# Patient Record
Sex: MALE | Race: WHITE | NOT HISPANIC OR LATINO
[De-identification: names, ages, dates, MRNs, and addresses within clinical notes are randomized per-mention and may not be internally consistent; named-entity substitution may affect disease eponyms.]

---

## 2017-03-08 PROBLEM — Z00.00 ENCOUNTER FOR PREVENTIVE HEALTH EXAMINATION: Status: ACTIVE | Noted: 2017-03-08

## 2017-04-25 ENCOUNTER — APPOINTMENT (OUTPATIENT)
Dept: GASTROENTEROLOGY | Facility: CLINIC | Age: 82
End: 2017-04-25

## 2017-04-25 VITALS
OXYGEN SATURATION: 98 % | BODY MASS INDEX: 29.82 KG/M2 | WEIGHT: 190 LBS | TEMPERATURE: 98.2 F | HEART RATE: 98 BPM | SYSTOLIC BLOOD PRESSURE: 128 MMHG | DIASTOLIC BLOOD PRESSURE: 76 MMHG | HEIGHT: 67 IN

## 2017-04-25 DIAGNOSIS — Z86.79 PERSONAL HISTORY OF OTHER DISEASES OF THE CIRCULATORY SYSTEM: ICD-10-CM

## 2017-04-25 DIAGNOSIS — K64.4 RESIDUAL HEMORRHOIDAL SKIN TAGS: ICD-10-CM

## 2017-04-25 DIAGNOSIS — L30.9 DERMATITIS, UNSPECIFIED: ICD-10-CM

## 2017-04-25 DIAGNOSIS — K59.00 CONSTIPATION, UNSPECIFIED: ICD-10-CM

## 2017-04-25 DIAGNOSIS — Z82.49 FAMILY HISTORY OF ISCHEMIC HEART DISEASE AND OTHER DISEASES OF THE CIRCULATORY SYSTEM: ICD-10-CM

## 2017-04-25 DIAGNOSIS — Z82.3 FAMILY HISTORY OF STROKE: ICD-10-CM

## 2017-04-25 DIAGNOSIS — K62.89 OTHER SPECIFIED DISEASES OF ANUS AND RECTUM: ICD-10-CM

## 2017-04-25 DIAGNOSIS — Z78.9 OTHER SPECIFIED HEALTH STATUS: ICD-10-CM

## 2017-04-25 RX ORDER — ATORVASTATIN CALCIUM 80 MG/1
80 TABLET, FILM COATED ORAL
Refills: 0 | Status: ACTIVE | COMMUNITY

## 2017-04-25 RX ORDER — MUPIROCIN 20 MG/G
2 OINTMENT TOPICAL TWICE DAILY
Qty: 1 | Refills: 1 | Status: ACTIVE | COMMUNITY
Start: 2017-04-25 | End: 1900-01-01

## 2018-01-09 ENCOUNTER — APPOINTMENT (OUTPATIENT)
Dept: OPHTHALMOLOGY | Facility: CLINIC | Age: 83
End: 2018-01-09
Payer: MEDICARE

## 2018-01-09 PROCEDURE — 92060 SENSORIMOTOR EXAMINATION: CPT

## 2018-01-09 PROCEDURE — 99204 OFFICE O/P NEW MOD 45 MIN: CPT

## 2018-01-09 RX ORDER — MIRABEGRON 50 MG/1
50 TABLET, FILM COATED, EXTENDED RELEASE ORAL
Qty: 90 | Refills: 0 | Status: ACTIVE | COMMUNITY
Start: 2017-03-30

## 2018-01-09 RX ORDER — MOXIFLOXACIN HYDROCHLORIDE 5 MG/ML
0.5 SOLUTION/ DROPS OPHTHALMIC
Qty: 3 | Refills: 0 | Status: ACTIVE | COMMUNITY
Start: 2017-10-03

## 2018-01-09 RX ORDER — PREDNISOLONE ACETATE 10 MG/ML
1 SUSPENSION/ DROPS OPHTHALMIC
Qty: 5 | Refills: 0 | Status: ACTIVE | COMMUNITY
Start: 2017-08-11

## 2018-02-16 ENCOUNTER — APPOINTMENT (OUTPATIENT)
Dept: OPHTHALMOLOGY | Facility: CLINIC | Age: 83
End: 2018-02-16
Payer: MEDICARE

## 2018-02-16 PROCEDURE — 92012 INTRM OPH EXAM EST PATIENT: CPT

## 2018-04-16 ENCOUNTER — APPOINTMENT (OUTPATIENT)
Dept: OPHTHALMOLOGY | Facility: CLINIC | Age: 83
End: 2018-04-16

## 2018-07-24 PROBLEM — Z78.9 ALCOHOL USE: Status: ACTIVE | Noted: 2017-04-25

## 2019-10-14 ENCOUNTER — OUTPATIENT (OUTPATIENT)
Dept: OUTPATIENT SERVICES | Facility: HOSPITAL | Age: 84
LOS: 1 days | Discharge: HOME | End: 2019-10-14

## 2019-10-15 ENCOUNTER — APPOINTMENT (OUTPATIENT)
Dept: RADIATION ONCOLOGY | Facility: CLINIC | Age: 84
End: 2019-10-15
Payer: MEDICARE

## 2019-10-15 VITALS
HEIGHT: 67 IN | WEIGHT: 196 LBS | SYSTOLIC BLOOD PRESSURE: 134 MMHG | BODY MASS INDEX: 30.76 KG/M2 | OXYGEN SATURATION: 98 % | TEMPERATURE: 36.4 F | RESPIRATION RATE: 16 BRPM | DIASTOLIC BLOOD PRESSURE: 86 MMHG | HEART RATE: 86 BPM

## 2019-10-15 PROCEDURE — 99204 OFFICE O/P NEW MOD 45 MIN: CPT | Mod: 25

## 2019-10-15 NOTE — PHYSICAL EXAM
[Normal] : oriented to person, place and time, the affect was normal, the mood was normal and not anxious [FreeTextEntry1] : Right leg medial aspect 1.5 X 2.0 cm skin lesion nodular

## 2019-10-15 NOTE — HISTORY OF PRESENT ILLNESS
[FreeTextEntry1] : Mr. Alexander is a 89 years old male with BCC of the proximal right tibia. He stated he had this for years. He had tried cream in the past given by his dermatologist Divine Johnson which had not help. \par Patient underwent right proximal pretibial region biopsy on 9/17/19, pathology report showed Basal cell carcinoma, nodular type fragmented.\par Today, he denies any pain, redness, or tingling sensation. He is able to ambulate without any difficulty.

## 2021-02-11 ENCOUNTER — TRANSCRIPTION ENCOUNTER (OUTPATIENT)
Age: 86
End: 2021-02-11

## 2021-08-10 ENCOUNTER — APPOINTMENT (OUTPATIENT)
Dept: UROLOGY | Facility: CLINIC | Age: 86
End: 2021-08-10

## 2021-10-25 ENCOUNTER — RX RENEWAL (OUTPATIENT)
Age: 86
End: 2021-10-25

## 2021-12-30 ENCOUNTER — RX RENEWAL (OUTPATIENT)
Age: 86
End: 2021-12-30

## 2022-01-15 ENCOUNTER — RX RENEWAL (OUTPATIENT)
Age: 87
End: 2022-01-15

## 2022-03-24 ENCOUNTER — RX RENEWAL (OUTPATIENT)
Age: 87
End: 2022-03-24

## 2022-05-10 ENCOUNTER — NON-APPOINTMENT (OUTPATIENT)
Age: 87
End: 2022-05-10

## 2022-05-10 ENCOUNTER — APPOINTMENT (OUTPATIENT)
Dept: CARDIOLOGY | Facility: CLINIC | Age: 87
End: 2022-05-10
Payer: MEDICARE

## 2022-05-10 VITALS
WEIGHT: 196 LBS | DIASTOLIC BLOOD PRESSURE: 84 MMHG | HEART RATE: 97 BPM | OXYGEN SATURATION: 98 % | TEMPERATURE: 97.6 F | SYSTOLIC BLOOD PRESSURE: 140 MMHG | BODY MASS INDEX: 30.76 KG/M2 | HEIGHT: 67 IN

## 2022-05-10 DIAGNOSIS — H49.12 FOURTH [TROCHLEAR] NERVE PALSY, LEFT EYE: ICD-10-CM

## 2022-05-10 PROCEDURE — 99214 OFFICE O/P EST MOD 30 MIN: CPT

## 2022-05-10 PROCEDURE — 93000 ELECTROCARDIOGRAM COMPLETE: CPT

## 2022-05-10 PROCEDURE — 99204 OFFICE O/P NEW MOD 45 MIN: CPT

## 2022-05-10 RX ORDER — METOPROLOL SUCCINATE 50 MG/1
50 TABLET, EXTENDED RELEASE ORAL
Qty: 180 | Refills: 0 | Status: DISCONTINUED | COMMUNITY
Start: 2017-11-03 | End: 2022-05-10

## 2022-05-10 RX ORDER — ASPIRIN 81 MG
81 TABLET, DELAYED RELEASE (ENTERIC COATED) ORAL
Refills: 0 | Status: DISCONTINUED | COMMUNITY
End: 2022-05-10

## 2022-05-10 RX ORDER — DUTASTERIDE AND TAMSULOSIN HYDROCHLORIDE .5; .4 MG/1; MG/1
0.5-0.4 CAPSULE ORAL
Refills: 0 | Status: ACTIVE | COMMUNITY
Start: 2022-05-10

## 2022-05-10 NOTE — DISCUSSION/SUMMARY
[FreeTextEntry1] : 1.  Patient will continue his present regimen of medications\par 2.  I will obtain my old records from my prior office including echo report etc.\par 3.  I reassured him that his cardiac status was stable.\par 4.  He will follow-up again in 3 months. \par \par  We will consider some noninvasive work-up in the future and I will obtain the results of his blood work from Dr. Edwin De La Rosa

## 2022-05-10 NOTE — ASSESSMENT
[FreeTextEntry1] : Eligio Alexander is many years status post coronary bypass surgery, has a history of TIA, atrial arrhythmia combination of atrial fibrillation perhaps atrial flutter on beta-blocker.  He has a variety of discomforts in his lower left side of his abdomen worse with movement and now some upper left chest discomfort which is very reproducible and is nonischemic.  His cardiac status is stable.\par \par I reassured him that the chest discomfort is upper chest and the lower abdominal discomfort are not cardiac related and most likely musculoskeletal.\par \par He has deteriorated in terms of his ability to exercise and walk related to his knee pain arthritis and balance issues

## 2022-05-10 NOTE — REASON FOR VISIT
[FreeTextEntry1] : Eligio Alexander 84 years old a long-term patient of mine who I have not seen for some time.  He called yesterday that he has been having intermittently some upper left-sided chest discomfort.

## 2022-05-10 NOTE — PHYSICAL EXAM
[Well Developed] : well developed [Well Nourished] : well nourished [Normal Conjunctiva] : normal conjunctiva [No Carotid Bruit] : no carotid bruit [Normal S1, S2] : normal S1, S2 [Clear Lung Fields] : clear lung fields [No Respiratory Distress] : no respiratory distress  [No Edema] : no edema [Normal DP B/L] : normal DP B/L [de-identified] : Fully alert oriented [de-identified] : No murmur no S3 P2 normal [de-identified] : No rales rhonchi or wheezes [de-identified] : Ventral hernia lower left-sided pain along the mid to lower abdomen tender to the touch the abdomen itself is soft no masses no bruit

## 2022-07-11 ENCOUNTER — RX RENEWAL (OUTPATIENT)
Age: 87
End: 2022-07-11

## 2022-10-03 ENCOUNTER — RX RENEWAL (OUTPATIENT)
Age: 87
End: 2022-10-03

## 2023-01-06 ENCOUNTER — RX RENEWAL (OUTPATIENT)
Age: 88
End: 2023-01-06

## 2023-03-21 ENCOUNTER — APPOINTMENT (OUTPATIENT)
Dept: CARDIOLOGY | Facility: CLINIC | Age: 88
End: 2023-03-21
Payer: MEDICARE

## 2023-03-21 ENCOUNTER — NON-APPOINTMENT (OUTPATIENT)
Age: 88
End: 2023-03-21

## 2023-03-21 VITALS
OXYGEN SATURATION: 99 % | HEART RATE: 89 BPM | DIASTOLIC BLOOD PRESSURE: 84 MMHG | WEIGHT: 187 LBS | SYSTOLIC BLOOD PRESSURE: 135 MMHG | BODY MASS INDEX: 29.29 KG/M2

## 2023-03-21 PROCEDURE — 93000 ELECTROCARDIOGRAM COMPLETE: CPT

## 2023-03-21 PROCEDURE — 99214 OFFICE O/P EST MOD 30 MIN: CPT

## 2023-03-21 NOTE — HISTORY OF PRESENT ILLNESS
[FreeTextEntry1] : In brief Eligio is 89 years old.\par Over 20 years ago he underwent coronary bypass surgery for unstable angina.  He has done extremely well since that time.  Is a history of hypertension and urinary frequency, hyperlipidemia.  No diabetes but a history of hyperuricemia and had been on Uloric\par \par In 2019 he presented with a transischemic attack and at that time was noted to be in an atrial arrhythmia.  Some of it appeared to be atrial fibrillation and some of it either atrial flutter.  He was placed on Eliquis at that time.  He has been followed by neurology Dr. Edwin De La Rosa who also follows his blood work and other medical conditions\par \par In 2021 July he was admitted to [facility] with urinary tract infection/sepsis\par \par He has had progressive decline in his ability to walk knee pain left lower abdominal discomfort particularly with movement of unclear etiology.\par \par He has balance issues as well\par He denies exertional chest pain exertional shortness of breath edema orthopnea PND.  He remains on stable medications.\par \par   July 2022, he developed some upper left-sided chest discomfort pinpoint worse with touching the area without associated nausea vomiting shortness of breath.  This pain has been intermittent for some time and again is nonexertional\par \par EKG  8/10 2022 either atrial fibrillation with a fairly regular ventricular rate of 99 or an atrial flutter with 4-1 block otherwise EKG is unremarkable with no significant ST or T wave changes\par \[facility] Visit March 21, 2023: Overall the patient is feeling well since his last visit in the summer 2022.  He denies chest pain chest pressure or shortness of breath or palpitations.  He has difficulty ambulating because of arthritis and balance issues.  He remains on Eliquis\par .\par   EKG reveals atrial fibrillation controlled ventricular  response.  Incomplete right bundle branch block no acute changes

## 2023-03-21 NOTE — DISCUSSION/SUMMARY
[FreeTextEntry1] :  patient continue on his present regimen of medication.\par   Routine follow with me again in 6 months unless is a change in his status\par  I will try to obtain the results of his blood tests from Dr. De La Rosa [EKG obtained to assist in diagnosis and management of assessed problem(s)] : EKG obtained to assist in diagnosis and management of assessed problem(s)

## 2023-03-21 NOTE — PHYSICAL EXAM
[Well Developed] : well developed [Well Nourished] : well nourished [Normal Conjunctiva] : normal conjunctiva [No Carotid Bruit] : no carotid bruit [Normal S1, S2] : normal S1, S2 [Clear Lung Fields] : clear lung fields [No Respiratory Distress] : no respiratory distress  [No Edema] : no edema [Normal DP B/L] : normal DP B/L [de-identified] : Fully alert oriented Elderly somewhat kyphotic [de-identified] : No murmur no S3 P2 normal rhythm irregularly irregular [de-identified] : No rales rhonchi or wheezes [de-identified] : Ventral hernia lower left-sided pain along the mid to lower abdomen tender to the touch the abdomen itself is soft no masses no bruit

## 2023-03-21 NOTE — ASSESSMENT
[FreeTextEntry1] : Eligio Alexander is many years status post coronary bypass surgery, has a history of TIA, atrial arrhythmia combination of atrial fibrillation perhaps atrial flutter on beta-blocker.  his cardiac status remains stable without angina or CHF and he tolerates his present regimen of medication.\par \par   He does have difficulty with walking which is probably related to arthritis and his kyphosis.\par \par   Presently there are no acute cardiac issues.\par \par \par 1.  Status post coronary bypass surgery many years ago no evidence of CHF or angina\par  2.  Atrial fibrillation ventricular rate controlled on beta-blocker and Eliquis\par  3.  Difficulty with ambulation\par \par  overall cardiac wise he is stable

## 2023-05-22 ENCOUNTER — RX RENEWAL (OUTPATIENT)
Age: 88
End: 2023-05-22

## 2023-10-02 ENCOUNTER — RX RENEWAL (OUTPATIENT)
Age: 88
End: 2023-10-02

## 2023-10-03 ENCOUNTER — NON-APPOINTMENT (OUTPATIENT)
Age: 88
End: 2023-10-03

## 2023-10-03 ENCOUNTER — APPOINTMENT (OUTPATIENT)
Dept: CARDIOLOGY | Facility: CLINIC | Age: 88
End: 2023-10-03
Payer: MEDICARE

## 2023-10-03 VITALS
WEIGHT: 186 LBS | HEART RATE: 68 BPM | BODY MASS INDEX: 29.13 KG/M2 | SYSTOLIC BLOOD PRESSURE: 135 MMHG | OXYGEN SATURATION: 94 % | DIASTOLIC BLOOD PRESSURE: 84 MMHG

## 2023-10-03 PROCEDURE — 99214 OFFICE O/P EST MOD 30 MIN: CPT

## 2023-10-03 PROCEDURE — 93000 ELECTROCARDIOGRAM COMPLETE: CPT

## 2024-01-09 ENCOUNTER — APPOINTMENT (OUTPATIENT)
Dept: CARDIOLOGY | Facility: CLINIC | Age: 89
End: 2024-01-09

## 2024-03-05 ENCOUNTER — RX RENEWAL (OUTPATIENT)
Age: 89
End: 2024-03-05

## 2024-03-05 RX ORDER — METOPROLOL SUCCINATE 25 MG/1
25 TABLET, EXTENDED RELEASE ORAL EVERY 6 HOURS
Qty: 360 | Refills: 1 | Status: ACTIVE | COMMUNITY
Start: 2017-06-26 | End: 1900-01-01

## 2024-04-19 ENCOUNTER — NON-APPOINTMENT (OUTPATIENT)
Age: 89
End: 2024-04-19

## 2024-04-19 ENCOUNTER — APPOINTMENT (OUTPATIENT)
Dept: CARDIOLOGY | Facility: CLINIC | Age: 89
End: 2024-04-19
Payer: MEDICARE

## 2024-04-19 VITALS
DIASTOLIC BLOOD PRESSURE: 78 MMHG | WEIGHT: 185 LBS | BODY MASS INDEX: 28.98 KG/M2 | HEART RATE: 98 BPM | OXYGEN SATURATION: 100 % | SYSTOLIC BLOOD PRESSURE: 130 MMHG

## 2024-04-19 DIAGNOSIS — I48.91 UNSPECIFIED ATRIAL FIBRILLATION: ICD-10-CM

## 2024-04-19 DIAGNOSIS — R07.89 OTHER CHEST PAIN: ICD-10-CM

## 2024-04-19 DIAGNOSIS — Z95.1 PRESENCE OF AORTOCORONARY BYPASS GRAFT: ICD-10-CM

## 2024-04-19 DIAGNOSIS — E79.0 HYPERURICEMIA W/OUT SIGNS OF INFLAMMATORY ARTHRITIS AND TOPHACEOUS DISEASE: ICD-10-CM

## 2024-04-19 DIAGNOSIS — R06.02 SHORTNESS OF BREATH: ICD-10-CM

## 2024-04-19 PROCEDURE — G2211 COMPLEX E/M VISIT ADD ON: CPT

## 2024-04-19 PROCEDURE — 99214 OFFICE O/P EST MOD 30 MIN: CPT

## 2024-04-19 PROCEDURE — 93000 ELECTROCARDIOGRAM COMPLETE: CPT

## 2024-05-08 ENCOUNTER — APPOINTMENT (OUTPATIENT)
Dept: CARDIOLOGY | Facility: CLINIC | Age: 89
End: 2024-05-08

## 2024-05-24 ENCOUNTER — APPOINTMENT (OUTPATIENT)
Dept: CARDIOLOGY | Facility: CLINIC | Age: 89
End: 2024-05-24
Payer: MEDICARE

## 2024-05-24 PROCEDURE — 93306 TTE W/DOPPLER COMPLETE: CPT

## 2024-06-21 ENCOUNTER — RX RENEWAL (OUTPATIENT)
Age: 89
End: 2024-06-21

## 2024-06-21 RX ORDER — CLOPIDOGREL BISULFATE 75 MG/1
75 TABLET, FILM COATED ORAL
Qty: 90 | Refills: 0 | Status: ACTIVE | COMMUNITY
Start: 2021-10-25 | End: 1900-01-01

## 2024-09-04 ENCOUNTER — RX RENEWAL (OUTPATIENT)
Age: 89
End: 2024-09-04

## 2024-09-23 ENCOUNTER — RX RENEWAL (OUTPATIENT)
Age: 89
End: 2024-09-23

## 2024-10-22 ENCOUNTER — APPOINTMENT (OUTPATIENT)
Dept: CARDIOLOGY | Facility: CLINIC | Age: 89
End: 2024-10-22
Payer: MEDICARE

## 2024-10-22 VITALS
HEART RATE: 99 BPM | BODY MASS INDEX: 29.29 KG/M2 | SYSTOLIC BLOOD PRESSURE: 149 MMHG | OXYGEN SATURATION: 97 % | WEIGHT: 187 LBS | DIASTOLIC BLOOD PRESSURE: 89 MMHG

## 2024-10-22 DIAGNOSIS — I48.91 UNSPECIFIED ATRIAL FIBRILLATION: ICD-10-CM

## 2024-10-22 DIAGNOSIS — Z95.1 PRESENCE OF AORTOCORONARY BYPASS GRAFT: ICD-10-CM

## 2024-10-22 DIAGNOSIS — R07.89 OTHER CHEST PAIN: ICD-10-CM

## 2024-10-22 DIAGNOSIS — R06.02 SHORTNESS OF BREATH: ICD-10-CM

## 2024-10-22 DIAGNOSIS — E79.0 HYPERURICEMIA W/OUT SIGNS OF INFLAMMATORY ARTHRITIS AND TOPHACEOUS DISEASE: ICD-10-CM

## 2024-10-22 PROCEDURE — 99214 OFFICE O/P EST MOD 30 MIN: CPT

## 2024-10-22 PROCEDURE — 93000 ELECTROCARDIOGRAM COMPLETE: CPT

## 2024-10-22 PROCEDURE — G2211 COMPLEX E/M VISIT ADD ON: CPT

## 2024-10-22 NOTE — REASON FOR VISIT
[FreeTextEntry1] : Ismael Colón 89 years old here for routine follow-up of his cardiac status.  He was seen on October 22, 2024

## 2024-10-22 NOTE — HISTORY OF PRESENT ILLNESS
[FreeTextEntry1] : In brief Ismael Colón is now 89 years old last seen in May 2024. Over 20 years ago he underwent coronary bypass surgery for unstable angina.  He has done extremely well since that time.  HE HAS  a history of hypertension and urinary frequency, hyperlipidemia.  No diabetes but a history of hyperuricemia and had been on Uloric  In 2019 he presented with a transischemic attack and at that time was noted to be in an atrial arrhythmia.  Some of it appeared to be atrial fibrillation and some of it either atrial flutter.  He was placed on Eliquis at that time.  He has been followed by neurology Dr. Serafin Dorado who also follows his blood work and other medical conditions  In 2021 July he was admitted to [facility] with urinary tract infection/sepsis  He has had progressive decline in his ability to walk knee pain left lower abdominal discomfort particularly with movement of unclear etiology.  He has balance issues as well He denies exertional chest pain exertional shortness of breath edema orthopnea PND.  He remains on stable medications.    July 2022, he developed some upper left-sided chest discomfort pinpoint worse with touching the area without associated nausea vomiting shortness of breath.  This pain has been intermittent for some time and again is nonexertional  EKG  8/10 2022 either atrial fibrillation with a fairly regular ventricular rate of 99 or an atrial flutter with 4-1 block otherwise EKG is unremarkable with no significant ST or T wave changes  Visit March 21, 2023: Overall the patient is feeling well since his last visit in the summer 2022.  He denies chest pain chest pressure or shortness of breath or palpitations.  He has difficulty ambulating because of arthritis and balance issues.  He remains on Eliquis .   EKG reveals atrial fibrillation controlled ventricular  response.  Incomplete right bundle branch block no acute changes  Visit October 3, 2023: Overall since the last visit the patient has been stable.  His main issue is ambulating as he has difficulty with balance and now needs at least a cane to walk though he has fallen once.  He has no chest pain palpitations dizziness or syncope.  He denies edema orthopnea PND.  He remains on stable medications which include Toprol-XL 25 mg 2 tablets in the morning and 2 tablets in the afternoon  EKG October 3, 2023 atrial fibrillation ventricular rate controlled at 70 incomplete right bundle branch block no change from prior EKG  Visit April 19, 2024: Patient remains mentally intact but has slowed down.  He has difficulty with ambulation.  His family has noticed that he has had some increased shortness of breath which prompted this visit. EKG reveals underlying rhythm unknown probably underlying atrial fibrillation ventricular rate controlled no acute changes  2D echo May 2024 CONCLUSIONS:  1. 1. Concentric remodeling of LV. Systolic function normal with paradoxical septial motion. Diastolic function indeterminant due to atrial fib. (E/e' 34) 2. LA moderately enlarged. 3. Calcified aortic valve with mild AS and no AI. 4. Mitral annular calcification and thickend leaflets with mild/moderate MR. 5. Enlarged RA. RV appears normal. 6. Mild/moderate TR with PA systolic pressure estimated at 43 (mild pulmonary hypertension). 7. Mildly enlarged ascending aorta (4.1 cm). LV function is overall preserved and there was no significant valvular disease  Visit October 22, 2024 Since last visit, the patient has been stable.  He has some chronic mild exertional shortness of breath.  He has slowed down his walking is slower.  He does have kyphoscoliosis. He remains on stable medication.  He denies chest pain chest pressure.  He gets some intermittent edema of the lower extremities which is now resolved with a small dose of diuretic

## 2024-10-22 NOTE — ASSESSMENT
[FreeTextEntry1] : Ismael Colón is many years status post coronary bypass surgery, has a history of TIA, atrial arrhythmia combination of atrial fibrillation perhaps atrial flutter on beta-blocker.  his cardiac status remains stable without angina or CHF and he tolerates his present regimen of medication.    He does have difficulty with walking which is probably related to arthritis and his kyphosis.  In April 2024 he had some increased shortness of breath but no chest pain.  2D echo revealed overall preserved LV function  He remains hemodynamically stable in October 2024  1.  Status post coronary bypass surgery many years ago no evidence of CHF or angina  2.  Atrial fibrillation ventricular rate controlled on beta-blocker and Eliquis  3.  Difficulty with ambulation 4.  Perhaps some increased shortness of breath with exertion no chest pain-preserved LV function on echo of May 2024

## 2024-10-22 NOTE — DISCUSSION/SUMMARY
[FreeTextEntry1] : Overall patient is clinically quite stable.  At age 89 he is functioning at a good level although his ambulation is somewhat slow and he does have some shortness of breath and some intermittent edema  He should remain on his present regimen of medication including the Eliquis Routine follow-up again in 4 months [EKG obtained to assist in diagnosis and management of assessed problem(s)] : EKG obtained to assist in diagnosis and management of assessed problem(s)

## 2024-10-22 NOTE — PHYSICAL EXAM
[Well Developed] : well developed [Well Nourished] : well nourished [Normal Conjunctiva] : normal conjunctiva [No Carotid Bruit] : no carotid bruit [Normal S1, S2] : normal S1, S2 [Clear Lung Fields] : clear lung fields [No Respiratory Distress] : no respiratory distress  [No Edema] : no edema [Normal DP B/L] : normal DP B/L [Abnormal Gait] : abnormal gait [de-identified] : Fully alert oriented Elderly somewhat kyphotic walks with a cane somewhat unsteady on his feet [de-identified] : No murmur no S3 P2 normal rhythm irregularly irregular [de-identified] : No rales rhonchi or wheezes [de-identified] : Ventral hernia present stable [de-identified] : Moves slowly

## 2024-12-02 ENCOUNTER — RX RENEWAL (OUTPATIENT)
Age: 88
End: 2024-12-02

## 2024-12-16 ENCOUNTER — RX RENEWAL (OUTPATIENT)
Age: 88
End: 2024-12-16

## 2024-12-24 PROBLEM — F10.90 ALCOHOL USE: Status: ACTIVE | Noted: 2017-04-25

## 2024-12-30 ENCOUNTER — RX RENEWAL (OUTPATIENT)
Age: 88
End: 2024-12-30

## 2025-02-18 ENCOUNTER — NON-APPOINTMENT (OUTPATIENT)
Age: 89
End: 2025-02-18

## 2025-02-18 ENCOUNTER — APPOINTMENT (OUTPATIENT)
Dept: CARDIOLOGY | Facility: CLINIC | Age: 89
End: 2025-02-18
Payer: MEDICARE

## 2025-02-18 VITALS
BODY MASS INDEX: 30.13 KG/M2 | SYSTOLIC BLOOD PRESSURE: 124 MMHG | HEART RATE: 91 BPM | HEIGHT: 67 IN | OXYGEN SATURATION: 100 % | WEIGHT: 192 LBS | DIASTOLIC BLOOD PRESSURE: 80 MMHG

## 2025-02-18 DIAGNOSIS — Z95.1 PRESENCE OF AORTOCORONARY BYPASS GRAFT: ICD-10-CM

## 2025-02-18 DIAGNOSIS — E79.0 HYPERURICEMIA W/OUT SIGNS OF INFLAMMATORY ARTHRITIS AND TOPHACEOUS DISEASE: ICD-10-CM

## 2025-02-18 DIAGNOSIS — R06.02 SHORTNESS OF BREATH: ICD-10-CM

## 2025-02-18 DIAGNOSIS — I48.91 UNSPECIFIED ATRIAL FIBRILLATION: ICD-10-CM

## 2025-02-18 PROCEDURE — 93000 ELECTROCARDIOGRAM COMPLETE: CPT

## 2025-02-18 PROCEDURE — 99213 OFFICE O/P EST LOW 20 MIN: CPT

## 2025-02-18 PROCEDURE — G2211 COMPLEX E/M VISIT ADD ON: CPT

## 2025-02-18 RX ORDER — APIXABAN 2.5 MG/1
2.5 TABLET, FILM COATED ORAL
Refills: 0 | Status: ACTIVE | COMMUNITY

## 2025-02-18 NOTE — DISCUSSION/SUMMARY
[FreeTextEntry1] : Overall patient is clinically quite stable.  At age 89 he is functioning at a good level although his ambulation is somewhat slow and he does have some shortness of breath and some intermittent edema  He should remain on his present regimen of medication including the Eliquis Routine follow-up again in 4 months No further cardiac workup needed [EKG obtained to assist in diagnosis and management of assessed problem(s)] : EKG obtained to assist in diagnosis and management of assessed problem(s)

## 2025-02-18 NOTE — HISTORY OF PRESENT ILLNESS
[FreeTextEntry1] : In brief Ismael Colón is now 89 years old last seen in May 2024. Over 20 years ago he underwent coronary bypass surgery for unstable angina.  He has done extremely well since that time.  HE HAS  a history of hypertension and urinary frequency, hyperlipidemia.  No diabetes but a history of hyperuricemia and had been on Uloric  In 2019 he presented with a transischemic attack and at that time was noted to be in an atrial arrhythmia.  Some of it appeared to be atrial fibrillation and some of it either atrial flutter.  He was placed on Eliquis at that time.  He has been followed by neurology Dr. Serafin Dorado who also follows his blood work and other medical conditions  In 2021 July he was admitted to [facility] with urinary tract infection/sepsis  He has had progressive decline in his ability to walk knee pain left lower abdominal discomfort particularly with movement of unclear etiology.  He has balance issues as well He denies exertional chest pain exertional shortness of breath edema orthopnea PND.  He remains on stable medications.    July 2022, he developed some upper left-sided chest discomfort pinpoint worse with touching the area without associated nausea vomiting shortness of breath.  This pain has been intermittent for some time and again is nonexertional  EKG  8/10 2022 either atrial fibrillation with a fairly regular ventricular rate of 99 or an atrial flutter with 4-1 block otherwise EKG is unremarkable with no significant ST or T wave changes  Visit March 21, 2023: Overall the patient is feeling well since his last visit in the summer 2022.  He denies chest pain chest pressure or shortness of breath or palpitations.  He has difficulty ambulating because of arthritis and balance issues.  He remains on Eliquis .   EKG reveals atrial fibrillation controlled ventricular  response.  Incomplete right bundle branch block no acute changes  Visit October 3, 2023: Overall since the last visit the patient has been stable.  His main issue is ambulating as he has difficulty with balance and now needs at least a cane to walk though he has fallen once.  He has no chest pain palpitations dizziness or syncope.  He denies edema orthopnea PND.  He remains on stable medications which include Toprol-XL 25 mg 2 tablets in the morning and 2 tablets in the afternoon  EKG October 3, 2023 atrial fibrillation ventricular rate controlled at 70 incomplete right bundle branch block no change from prior EKG  Visit April 19, 2024: Patient remains mentally intact but has slowed down.  He has difficulty with ambulation.  His family has noticed that he has had some increased shortness of breath which prompted this visit. EKG reveals underlying rhythm unknown probably underlying atrial fibrillation ventricular rate controlled no acute changes  2D echo May 2024 CONCLUSIONS:  1. 1. Concentric remodeling of LV. Systolic function normal with paradoxical septial motion. Diastolic function indeterminant due to atrial fib. (E/e' 34) 2. LA moderately enlarged. 3. Calcified aortic valve with mild AS and no AI. 4. Mitral annular calcification and thickend leaflets with mild/moderate MR. 5. Enlarged RA. RV appears normal. 6. Mild/moderate TR with PA systolic pressure estimated at 43 (mild pulmonary hypertension). 7. Mildly enlarged ascending aorta (4.1 cm). LV function is overall preserved and there was no significant valvular disease  Visit October 22, 2024 Since last visit, the patient has been stable.  He has some chronic mild exertional shortness of breath.  He has slowed down his walking is slower.  He does have kyphoscoliosis. He remains on stable medication.  He denies chest pain chest pressure.  He gets some intermittent edema of the lower extremities which is now resolved with a small dose of diuretic  VisitFebruary 18, 2025 There is been no significant change in the patient's status since the last visit.  He denies shortness of breath chest pain dizziness or syncope.  He remains on stable medication EKG is unchanged underlying rhythm difficult to ascertain either atrial flutter or atrial for ventricular rate controlled RSR prime V1 no acute changes

## 2025-02-18 NOTE — PHYSICAL EXAM
[Well Developed] : well developed [Well Nourished] : well nourished [Normal Conjunctiva] : normal conjunctiva [No Carotid Bruit] : no carotid bruit [Normal S1, S2] : normal S1, S2 [Clear Lung Fields] : clear lung fields [No Respiratory Distress] : no respiratory distress  [Abnormal Gait] : abnormal gait [No Edema] : no edema [Normal DP B/L] : normal DP B/L [de-identified] : Fully alert oriented Elderly somewhat kyphotic walks with a cane somewhat unsteady on his feet [de-identified] : No murmur no S3 P2 normal rhythm irregularly irregular [de-identified] : No rales rhonchi or wheezes [de-identified] : Ventral hernia present stable [de-identified] : Moves slowly [de-identified] : Skin is dry multiple ecchymoses

## 2025-02-18 NOTE — REASON FOR VISIT
[FreeTextEntry1] : Ismael Eldon 89 years oldHere for routine follow-up of his cardiac status.He was seen on February 18, 2025

## 2025-02-18 NOTE — ASSESSMENT
[FreeTextEntry1] : Ismael Colón is many years status post coronary bypass surgery, has a history of TIA, atrial arrhythmia combination of atrial fibrillation perhaps atrial flutter on beta-blocker.  his cardiac status remains stable without angina or CHF and he tolerates his present regimen of medication.    He does have difficulty with walking which is probably related to arthritis and his kyphosis.  In April 2024 he had some increased shortness of breath but no chest pain.  2D echo revealed overall preserved LV function  He remains hemodynamically stable in October 2024And again in February 2025.  There has been no change in his status and overall he is hemodynamically stable  1.  Status post coronary bypass surgery many years ago no evidence of CHF or angina  2.  Atrial fibrillation ventricular rate controlled on beta-blocker and Eliquis  3.  Difficulty with ambulation 4.  No significant shortness of breath

## 2025-02-24 ENCOUNTER — RX RENEWAL (OUTPATIENT)
Age: 89
End: 2025-02-24

## 2025-06-17 ENCOUNTER — APPOINTMENT (OUTPATIENT)
Dept: CARDIOLOGY | Facility: CLINIC | Age: 89
End: 2025-06-17
Payer: MEDICARE

## 2025-06-17 VITALS — WEIGHT: 185 LBS | BODY MASS INDEX: 28.98 KG/M2

## 2025-06-17 VITALS — HEART RATE: 89 BPM | OXYGEN SATURATION: 99 % | SYSTOLIC BLOOD PRESSURE: 122 MMHG | DIASTOLIC BLOOD PRESSURE: 80 MMHG

## 2025-06-17 PROCEDURE — G2211 COMPLEX E/M VISIT ADD ON: CPT

## 2025-06-17 PROCEDURE — 99213 OFFICE O/P EST LOW 20 MIN: CPT

## 2025-06-17 RX ORDER — ATORVASTATIN CALCIUM 80 MG/1
80 TABLET, FILM COATED ORAL
Refills: 0 | Status: ACTIVE | COMMUNITY
Start: 2025-06-17

## 2025-09-08 ENCOUNTER — RX RENEWAL (OUTPATIENT)
Age: 89
End: 2025-09-08

## 2025-09-16 ENCOUNTER — NON-APPOINTMENT (OUTPATIENT)
Age: 89
End: 2025-09-16

## 2025-09-16 ENCOUNTER — APPOINTMENT (OUTPATIENT)
Dept: CARDIOLOGY | Facility: CLINIC | Age: 89
End: 2025-09-16
Payer: MEDICARE

## 2025-09-16 VITALS
DIASTOLIC BLOOD PRESSURE: 60 MMHG | BODY MASS INDEX: 30.91 KG/M2 | HEART RATE: 84 BPM | WEIGHT: 197.38 LBS | OXYGEN SATURATION: 96 % | SYSTOLIC BLOOD PRESSURE: 120 MMHG

## 2025-09-16 DIAGNOSIS — R07.89 OTHER CHEST PAIN: ICD-10-CM

## 2025-09-16 DIAGNOSIS — I48.91 UNSPECIFIED ATRIAL FIBRILLATION: ICD-10-CM

## 2025-09-16 DIAGNOSIS — Z95.1 PRESENCE OF AORTOCORONARY BYPASS GRAFT: ICD-10-CM

## 2025-09-16 DIAGNOSIS — R06.02 SHORTNESS OF BREATH: ICD-10-CM

## 2025-09-16 PROCEDURE — 99204 OFFICE O/P NEW MOD 45 MIN: CPT

## 2025-09-16 PROCEDURE — G2211 COMPLEX E/M VISIT ADD ON: CPT

## 2025-09-16 PROCEDURE — 93000 ELECTROCARDIOGRAM COMPLETE: CPT
